# Patient Record
Sex: FEMALE | Race: OTHER | NOT HISPANIC OR LATINO | URBAN - METROPOLITAN AREA
[De-identification: names, ages, dates, MRNs, and addresses within clinical notes are randomized per-mention and may not be internally consistent; named-entity substitution may affect disease eponyms.]

---

## 2018-06-05 ENCOUNTER — EMERGENCY (EMERGENCY)
Facility: HOSPITAL | Age: 33
LOS: 1 days | Discharge: HOME | End: 2018-06-05
Attending: EMERGENCY MEDICINE
Payer: SELF-PAY

## 2018-06-05 VITALS
SYSTOLIC BLOOD PRESSURE: 145 MMHG | WEIGHT: 279.99 LBS | TEMPERATURE: 98 F | RESPIRATION RATE: 18 BRPM | DIASTOLIC BLOOD PRESSURE: 86 MMHG | OXYGEN SATURATION: 98 % | HEIGHT: 62 IN | HEART RATE: 78 BPM

## 2018-06-05 PROCEDURE — 99283 EMERGENCY DEPT VISIT LOW MDM: CPT

## 2018-06-05 RX ORDER — IBUPROFEN 200 MG
1 TABLET ORAL
Qty: 15 | Refills: 0 | OUTPATIENT
Start: 2018-06-05 | End: 2018-06-09

## 2018-06-05 RX ORDER — IBUPROFEN 200 MG
400 TABLET ORAL ONCE
Qty: 0 | Refills: 0 | Status: COMPLETED | OUTPATIENT
Start: 2018-06-05 | End: 2018-06-05

## 2018-06-05 RX ADMIN — Medication 400 MILLIGRAM(S): at 12:22
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
## 2018-06-05 NOTE — ED PROVIDER NOTE - MEDICAL DECISION MAKING DETAILS
Patient with mild right lumber paraspinal muscle tenderness on palpation.  Will rx ibuprofen and f/u PCP.

## 2018-06-05 NOTE — ED ADULT NURSE NOTE - CHPI ED SYMPTOMS NEG
no headache/no difficulty bearing weight/no laceration/no sleeping issues/no loss of consciousness/no disorientation/no neck tenderness/no bruising/no dizziness/no crying/no decreased eating/drinking/no fussiness

## 2018-06-05 NOTE — ED PROVIDER NOTE - NS CPE EDP MUSC LUMBAR LOC
mild muscle tenderness right paraspinal, no C-T-LS midline tenderness, can bend and touch toes and ambulate without difficulty/tenderness
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
## 2022-01-12 NOTE — ED ADULT TRIAGE NOTE - AS TEMP SITE
Recent PHQ 2/9 Score    PHQ 2:  Date Adult PHQ 2 Score Adult PHQ 2 Interpretation   1/12/2022 0 No further screening needed       PHQ 9:      oral